# Patient Record
Sex: FEMALE
[De-identification: names, ages, dates, MRNs, and addresses within clinical notes are randomized per-mention and may not be internally consistent; named-entity substitution may affect disease eponyms.]

---

## 2020-05-15 ENCOUNTER — HOSPITAL ENCOUNTER (OUTPATIENT)
Dept: HOSPITAL 95 - LAB EV | Age: 70
Discharge: HOME | End: 2020-05-15
Attending: PHYSICIAN ASSISTANT
Payer: COMMERCIAL

## 2020-05-15 DIAGNOSIS — I10: Primary | ICD-10-CM

## 2020-05-15 LAB
ANION GAP SERPL CALCULATED.4IONS-SCNC: 11 MMOL/L (ref 6–16)
BUN SERPL-MCNC: 15 MG/DL (ref 8–24)
CALCIUM SERPL-MCNC: 9.2 MG/DL (ref 8.5–10.1)
CHLORIDE SERPL-SCNC: 100 MMOL/L (ref 98–108)
CO2 SERPL-SCNC: 27 MMOL/L (ref 21–32)
CREAT SERPL-MCNC: 0.86 MG/DL (ref 0.4–1)
GLUCOSE SERPL-MCNC: 108 MG/DL (ref 70–99)
POTASSIUM SERPL-SCNC: 3.9 MMOL/L (ref 3.5–5.5)
SODIUM SERPL-SCNC: 138 MMOL/L (ref 136–145)

## 2022-08-01 ENCOUNTER — HOSPITAL ENCOUNTER (OUTPATIENT)
Dept: HOSPITAL 95 - MOI US | Age: 72
LOS: 4 days | Discharge: HOME | End: 2022-08-05
Attending: STUDENT IN AN ORGANIZED HEALTH CARE EDUCATION/TRAINING PROGRAM
Payer: COMMERCIAL

## 2022-08-01 DIAGNOSIS — C50.912: Primary | ICD-10-CM

## 2022-08-03 ENCOUNTER — HOSPITAL ENCOUNTER (OUTPATIENT)
Dept: HOSPITAL 95 - ORSCMMR | Age: 72
Discharge: HOME | End: 2022-08-03
Attending: STUDENT IN AN ORGANIZED HEALTH CARE EDUCATION/TRAINING PROGRAM
Payer: COMMERCIAL

## 2022-08-03 VITALS — HEIGHT: 66 IN | BODY MASS INDEX: 29.62 KG/M2 | WEIGHT: 184.31 LBS

## 2022-08-03 DIAGNOSIS — Z79.84: ICD-10-CM

## 2022-08-03 DIAGNOSIS — E11.9: ICD-10-CM

## 2022-08-03 DIAGNOSIS — Z79.899: ICD-10-CM

## 2022-08-03 DIAGNOSIS — Z17.0: ICD-10-CM

## 2022-08-03 DIAGNOSIS — G47.33: ICD-10-CM

## 2022-08-03 DIAGNOSIS — C50.912: Primary | ICD-10-CM

## 2022-08-03 DIAGNOSIS — F32.A: ICD-10-CM

## 2022-08-03 DIAGNOSIS — D36.0: ICD-10-CM

## 2022-08-03 DIAGNOSIS — I10: ICD-10-CM

## 2022-08-03 DIAGNOSIS — E03.9: ICD-10-CM

## 2022-08-03 PROCEDURE — 0HBU0ZZ EXCISION OF LEFT BREAST, OPEN APPROACH: ICD-10-PCS | Performed by: STUDENT IN AN ORGANIZED HEALTH CARE EDUCATION/TRAINING PROGRAM

## 2022-08-03 PROCEDURE — A9270 NON-COVERED ITEM OR SERVICE: HCPCS

## 2022-08-03 PROCEDURE — 07B60ZX EXCISION OF LEFT AXILLARY LYMPHATIC, OPEN APPROACH, DIAGNOSTIC: ICD-10-PCS | Performed by: STUDENT IN AN ORGANIZED HEALTH CARE EDUCATION/TRAINING PROGRAM

## 2022-08-03 PROCEDURE — A9520 TC99 TILMANOCEPT DIAG 0.5MCI: HCPCS

## 2022-08-03 NOTE — NUR
Patient up to Ambulate independently. Gait steady WITH CANE.
Discharge instructions reviewed with patient. Patient verbalizes understanding.
Copy given to patient to take home. PT DENIES ANY FURTHER QUESTIONS.
Patient States Post-Procedure ride home has been arranged WITH FRIEND PARAS.
Discharged via wheelchair to private car for ride home.